# Patient Record
Sex: MALE | Race: WHITE
[De-identification: names, ages, dates, MRNs, and addresses within clinical notes are randomized per-mention and may not be internally consistent; named-entity substitution may affect disease eponyms.]

---

## 2020-02-01 ENCOUNTER — HOSPITAL ENCOUNTER (OUTPATIENT)
Dept: HOSPITAL 59 - ER | Age: 60
Setting detail: OBSERVATION
LOS: 1 days | Discharge: HOME | End: 2020-02-02
Attending: INTERNAL MEDICINE | Admitting: INTERNAL MEDICINE
Payer: OTHER GOVERNMENT

## 2020-02-01 DIAGNOSIS — Z86.14: ICD-10-CM

## 2020-02-01 DIAGNOSIS — D75.81: ICD-10-CM

## 2020-02-01 DIAGNOSIS — R42: ICD-10-CM

## 2020-02-01 DIAGNOSIS — I26.99: ICD-10-CM

## 2020-02-01 DIAGNOSIS — Z96.89: ICD-10-CM

## 2020-02-01 DIAGNOSIS — D64.9: Primary | ICD-10-CM

## 2020-02-01 DIAGNOSIS — R60.9: ICD-10-CM

## 2020-02-01 DIAGNOSIS — D45: ICD-10-CM

## 2020-02-01 DIAGNOSIS — M19.90: ICD-10-CM

## 2020-02-01 DIAGNOSIS — Z79.01: ICD-10-CM

## 2020-02-01 DIAGNOSIS — I82.0: ICD-10-CM

## 2020-02-01 LAB
ABSOLUTE NEUTROPHIL COUNT: 1.34
ALBUMIN SERPL-MCNC: 4 G/DL (ref 4–5)
ALBUMIN/GLOB SERPL: 1.7 {RATIO} (ref 1.1–1.8)
ALP SERPL-CCNC: 52 U/L (ref 40–129)
ALT SERPL-CCNC: 51 U/L (ref ?–41)
ANION GAP SERPL CALC-SCNC: 13 MMOL/L (ref 7–16)
APTT BLD: 27.3 SECONDS (ref 24.5–39.1)
AST SERPL-CCNC: 36 U/L (ref 10–50)
BILIRUB SERPL-MCNC: 1.4 MG/DL (ref 0.2–1)
BUN SERPL-MCNC: 14 MG/DL (ref 8–23)
CO2 SERPL-SCNC: 23 MMOL/L (ref 22–29)
CREAT SERPL-MCNC: 0.6 MG/DL (ref 0.7–1.2)
EOSINOPHIL NFR BLD: 3 % (ref 0–6)
ERYTHROCYTE [DISTWIDTH] IN BLOOD BY AUTOMATED COUNT: 16.8 % (ref 11.5–14.5)
EST GLOMERULAR FILTRATION RATE: > 60 ML/MIN
GLOBULIN SER-MCNC: 2.4 GM/DL (ref 1.4–4.8)
GLUCOSE SERPL-MCNC: 111 MG/DL (ref 74–109)
HCT VFR BLD CALC: 18.3 % (ref 42–52)
HGB BLD-MCNC: 5.5 GM/DL (ref 14–18)
INR PPP: 1.3
LYMPHOCYTES NFR BLD: 21 % (ref 16–45)
MCH RBC QN AUTO: 27.6 PG (ref 27–33)
MCHC RBC AUTO-ENTMCNC: 30.1 G/DL (ref 32–36)
MCV RBC AUTO: 92 FL (ref 81–97)
MONOCYTES NFR BLD: 4 % (ref 0–9)
NEUTROPHILS NFR BLD AUTO: 64 % (ref 47–80)
NEUTS BAND NFR BLD: 8 % (ref 0–5)
PLATELET # BLD EST: (no result) 10*3/UL
PLATELET # BLD: 85 K/UL (ref 130–400)
PMV BLD AUTO: 11.5 FL (ref 7.4–10.4)
PROT SERPL-MCNC: 6.4 G/DL (ref 6.6–8.7)
PROTHROMBIN TIME: 13.2 SECONDS (ref 9.5–12.1)
RBC # BLD AUTO: 1.99 M/UL (ref 4.4–5.7)
WBC # BLD AUTO: 2 K/UL (ref 4.2–12.2)

## 2020-02-01 PROCEDURE — 93005 ELECTROCARDIOGRAM TRACING: CPT

## 2020-02-01 PROCEDURE — 86901 BLOOD TYPING SEROLOGIC RH(D): CPT

## 2020-02-01 PROCEDURE — 80053 COMPREHEN METABOLIC PANEL: CPT

## 2020-02-01 PROCEDURE — 85025 COMPLETE CBC W/AUTO DIFF WBC: CPT

## 2020-02-01 PROCEDURE — 93010 ELECTROCARDIOGRAM REPORT: CPT

## 2020-02-01 PROCEDURE — 99236 HOSP IP/OBS SAME DATE HI 85: CPT

## 2020-02-01 PROCEDURE — 99285 EMERGENCY DEPT VISIT HI MDM: CPT

## 2020-02-01 PROCEDURE — 86900 BLOOD TYPING SEROLOGIC ABO: CPT

## 2020-02-01 PROCEDURE — 86850 RBC ANTIBODY SCREEN: CPT

## 2020-02-01 PROCEDURE — 80048 BASIC METABOLIC PNL TOTAL CA: CPT

## 2020-02-01 PROCEDURE — 85610 PROTHROMBIN TIME: CPT

## 2020-02-01 PROCEDURE — 71045 X-RAY EXAM CHEST 1 VIEW: CPT

## 2020-02-01 PROCEDURE — 85730 THROMBOPLASTIN TIME PARTIAL: CPT

## 2020-02-01 PROCEDURE — 36430 TRANSFUSION BLD/BLD COMPNT: CPT

## 2020-02-01 PROCEDURE — 85027 COMPLETE CBC AUTOMATED: CPT

## 2020-02-01 PROCEDURE — 83880 ASSAY OF NATRIURETIC PEPTIDE: CPT

## 2020-02-01 NOTE — EMERGENCY DEPARTMENT RECORD
History of Present Illness





- General


Chief Complaint: Recheck - Other


Stated Complaint: NEEDS TRANSFUSION


Time Seen by Provider: 20 22:32


Source: Patient, Family


Mode of arrival: Ambulatory


Limitations: No limitations





- History of Present Illness


Initial Comments: 


The patient is here due to being weak, fatigued and mildly dizzy for one day. He

has a long hx of myelodyplastic syndrome and needs frequent blood transfusions. 

The patient is visiting from [facility] and is going home in 2 days for a 

Stem Cell transplant evaluation. He was here 5 weeks ago and did receive 2 units

of blood and since that visit has received 10 units more. Now he is feeling like

he is anemic again. The patient denies any fever, CP, or SOB but he does have a 

mild HA which also is typical for when he needs blood. 





MD Complaint: Other


Onset/Timin


-: Days(s)


Returns Today for: Other


Symptoms Since Prior Visit: Other


Associated Symptoms: Malaise





- Related Data


                                Home Medications











 Medication  Instructions  Recorded  Confirmed  Last Taken


 


Amiloride HCl  20  Unknown


 


Bumetanide 2 mg 20  Unknown


 


[id number]  20  Unknown


 


Deferiprone [Ferriprox] BID 20


 


Pramipexole Di-HCl [Mirapex] 1.25 mg PO 20  Unknown


 


Rivaroxaban [Xarelto] DAILY 20 19:30


 


Ruxolitinib Phosphate [Jakafi] BID 20 19:30


 


Tamsulosin HCl [Flomax] 0.4 mg PO 20  Unknown











                                    Allergies











Allergy/AdvReac Type Severity Reaction Status Date / Time


 


guaifenesin [From Mucinex D] AdvReac  PT UNSURE Verified 19 10:58





   OF REACTION  


 


pseudoephedrine AdvReac  PT UNSURE Verified 19 10:58





[From Mucinex D]   OF REACTION  














Travel Screening





- Travel/Exposure Within Last 30 Days


Have you traveled within the last 30 days?: Yes


Location Detail:: from [place]





- Travel/Exposure Within Last Year


Have you traveled outside the [place] in the last year?: No





- Additonal Travel Details


Have you been exposed to anyone with a communicable illness?: No





- Travel Symptoms


Symptom Screening: None





Review of Systems


Constitutional: Denies: Chills, Fever


Eyes: Denies: Eye discharge


ENT: Denies: Congestion


Respiratory: Denies: Cough, Dyspnea


Cardiovascular: Denies: Chest pain


Endocrine: Reports: Fatigue


Gastrointestinal: Denies: Nausea


Genitourinary: Denies: Dysuria


Musculoskeletal: Denies: Arthralgia


Neurological: Denies: Abnormal gait, Confusion





Past Medical History





- SOCIAL HISTORY


Smoking Status: Never smoker


Alcohol Use: None


Drug Use: None





- RESPIRATORY


Hx Respiratory Disorders: No


Hx Pneumonia: Yes (x3 MRSA)


Comment:: sinus surgery





- CARDIOVASCULAR


Hx Cardio Disorders: No





- NEURO


Hx Neuro Disorders: No





- GI


Hx GI Disorders: No





- 


Hx Genitourinary Disorders: No





- ENDOCRINE


Hx Endocrine Disorders: No


Hx Diabetes: No


Hx Thyroid Disease: No





- MUSCULOSKELETAL


Hx Musculoskeletal Disorders: Yes


Hx Arthritis: Yes


Hx Back Injury: Yes





- PSYCH


Hx Psych Problems: No





- HEMATOLOGY/ONCOLOGY


Hx Hematology/Oncology Disorders: Yes


Hx Anemia: Yes


Hx Blood Disorders: Yes


Hx Blood Transfusions: Yes


Hx Blood Transfusion Reaction: No





Family Medical History


Any Significant Family History?: No


Hx Resp Disorders: Father, Mother





Physical Exam





- General


General Appearance: Alert, Oriented x3, Cooperative, No acute distress





- Head


Head exam: Atraumatic, Normocephalic, Normal inspection





- Eye


Eye exam: Normal appearance, PERRL





- ENT


Throat exam: Normal inspection.  negative: Tonsillar erythema, Tonsillar exudate





- Neck


Neck exam: Normal inspection, Full ROM.  negative: Tenderness





- Respiratory


Respiratory exam: Normal lung sounds bilaterally.  negative: Respiratory distres

s





- Cardiovascular


Cardiovascular Exam: Regular rate, Normal rhythm, Normal heart sounds





- Extremities


Extremities exam: Pedal edema (1+ bilaterally.).  negative: Normal inspection





- Neurological


Neurological exam: Alert.  negative: Motor sensory deficit





Course





                                   Vital Signs











  20





  22:34


 


Temperature 97.9 F


 


Pulse Rate [ 96 H





Pulse Ox Probe] 


 


Respiratory 20





Rate 


 


Blood Pressure 122/72





[Left Arm] 


 


Pulse Ox 100














- Reevaluation(s)


Reevaluation #1: 


The patient is doing very well at this time. I did discuss the low HGB and the 

need for admission for transfusions and the patient does agree.


20 23:41








Medical Decision Making





- Data Complexity


MDM Data: Labs Ordered and/or Reviewed, X-Ray Ordered and/or Reviewed, EKG 

Ordered and/or Reviewed





- Lab Data


Result diagrams: 


                                 20 22:54





                                 20 23:01





- EKG Data


-: EKG Interpreted by Me


EKG: No Acute Changes (Prolonged MI interval.)





- Radiology Data


Radiology results: Report reviewed (CXR; Neg.)





Disposition


Disposition: Admit


Clinical Impression: 


Anemia


Qualifiers:


 Anemia type: unspecified type Qualified Code(s): D64.9 - Anemia, unspecified





Disposition: Still a Patient at [facility]


Decision to Admit: Admit from ER


Decision to Admit Date: 20


Decision to Admit Time: 23:45


Accepting Physician: Dina.


Time Discussed w/Accepting Physician: 23:44


Condition: (2) Stable


Time of Disposition: 23:44





Quality





- Quality Measures


Quality Measures: N/A





- Blood Pressure Screening


View Details: Yes


Does Patient Have Any of the Following: No


Blood Pressure Classification: Normal BP Reading


Systolic Measurement: 102


Diastolic Measurement: 62


Screening for High Blood Pressure: < Normal BP, F/U Not Required > []

## 2020-02-02 LAB
ABO GROUP: (no result)
ABSOLUTE NEUTROPHIL COUNT: 1.6
ABSOLUTE NEUTROPHIL COUNT: 3.09
ANION GAP SERPL CALC-SCNC: 15 MMOL/L (ref 7–16)
ANTIBODY SCREEN: NEGATIVE
BASOPHILS NFR BLD: 0 % (ref 0–6)
BASOPHILS NFR BLD: 0.7 % (ref 0–6)
BUN SERPL-MCNC: 14 MG/DL (ref 8–23)
CO2 SERPL-SCNC: 25 MMOL/L (ref 22–29)
CREAT SERPL-MCNC: 0.7 MG/DL (ref 0.7–1.2)
EOSINOPHIL NFR BLD: 2.9 % (ref 0–6)
EOSINOPHIL NFR BLD: 4 % (ref 0–6)
ERYTHROCYTE [DISTWIDTH] IN BLOOD BY AUTOMATED COUNT: 15.9 % (ref 11.5–14.5)
ERYTHROCYTE [DISTWIDTH] IN BLOOD BY AUTOMATED COUNT: 16.1 % (ref 11.5–14.5)
EST GLOMERULAR FILTRATION RATE: > 60 ML/MIN
GLUCOSE SERPL-MCNC: 188 MG/DL (ref 74–109)
GRANULOCYTES NFR BLD: 75.1 % (ref 47–80)
HCT VFR BLD CALC: 21 % (ref 42–52)
HCT VFR BLD CALC: 29.1 % (ref 42–52)
HGB BLD-MCNC: 6.6 GM/DL (ref 14–18)
HGB BLD-MCNC: 9.2 GM/DL (ref 14–18)
HYPOCHROMIA BLD QL SMEAR: (no result)
IMMED. SPIN CROSSMATCH: (no result)
LYMPHOCYTES NFR BLD AUTO: 15.5 % (ref 16–45)
LYMPHOCYTES NFR BLD: 14 % (ref 16–45)
MCH RBC QN AUTO: 28.2 PG (ref 27–33)
MCH RBC QN AUTO: 28.6 PG (ref 27–33)
MCHC RBC AUTO-ENTMCNC: 31.4 G/DL (ref 32–36)
MCHC RBC AUTO-ENTMCNC: 31.6 G/DL (ref 32–36)
MCV RBC AUTO: 89.3 FL (ref 81–97)
MCV RBC AUTO: 91.3 FL (ref 81–97)
MONOCYTES NFR BLD: 2 % (ref 0–9)
MONOCYTES NFR BLD: 5.8 % (ref 0–9)
NEUTROPHILS NFR BLD AUTO: 80 % (ref 47–80)
NEUTS BAND NFR BLD: 0 % (ref 0–5)
PLATELET # BLD EST: (no result) 10*3/UL
PLATELET # BLD: 104 K/UL (ref 130–400)
PLATELET # BLD: 74 K/UL (ref 130–400)
PMV BLD AUTO: 11.6 FL (ref 7.4–10.4)
PMV BLD AUTO: 12 FL (ref 7.4–10.4)
RBC # BLD AUTO: 2.3 M/UL (ref 4.4–5.7)
RBC # BLD AUTO: 3.26 M/UL (ref 4.4–5.7)
RH TYPE: POSITIVE
WBC # BLD AUTO: 2 K/UL (ref 4.2–12.2)
WBC # BLD AUTO: 4.1 K/UL (ref 4.2–12.2)

## 2020-02-02 NOTE — RADIOLOGY REPORT
EXAMINATION: Single View Chest

EXAM DATE:  2/2/2020 12:11 AM



TECHNIQUE:  Single view chest



INDICATION:  VIVIENNE hx

COMPARISON:  None. 



ENCOUNTER: Not applicable

_________________________



FINDINGS:  



The heart, mediastinum, and pulmonary vasculature are normal.   No lung consolidation or pleural effu
sions are present.  No pneumothorax is present. 

_________________________



IMPRESSION:



No acute cardiopulmonary disease is present.





Dictated by: Hannah Gambino MD on 2/2/2020 12:17 AM.

Electronically signed by: Hannah Gambino MD on 2/2/2020 12:20 AM.

## 2020-02-02 NOTE — HISTORY & PHYSICAL
History of Present Illness





- Date of Service


Date of Service for History & Physical: 02/02/20





- History of Present Illness


Admitting Diagnosis: 1. Acute Severe Anemia.


History of Present Illness: 


The patient is here due to being weak, fatigued and mildly dizzy for one day. He

has a long hx of myelodyplastic syndrome and needs frequent blood transfusions. 

The patient is visiting from [facility] and is going home in 2 days for a 

Stem Cell transplant evaluation. He was here 5 weeks ago and did receive 2 units

of blood and since that visit has received 10 units more. Now he is feeling like

he is anemic again. The patient denies any fever, CP, or SOB but he does have a 

mild HA which also is typical for when he needs blood. He does have history of 

hemochromatosis that was diagnosed in 2007, had several plasma phoresis 

treatments and was subsequently diagnosed with Budd Chiari syndrome and 

myelofibrosis in 2012. He is currently enrolled in a drug trial taking CPI 0610 

that began in August 2019, has received 8 cycles to date. During TIPPS procedure

in 2012 he developed right PE and has been on anticoagulant therapy Xarelto.  He

reports is at his baseline chronic health and usual lab values including 

chronically elevated bilirubin. He will be admitted for transfusion PRBC to goal

of Hgb 7-8 prior to discharge for his planned travel back to his home in [place] 

to resume his treatment plan for possible stem cell transplant. PCP Dr. Lyles In

man





Significant ED findings;


WBC 2.0


Hgb 5.5


Hct 18.3


Plt 85


Coags normal


Calcium 8.6


Total bilirubin 1.4


.4


CXR negative


EKG NSR, prolonged IA





                          PAST MEDICAL/SURGICAL HISTORY





Past Surgical History            right inquinal hernia 10/13/89


                                 20/2012 TIPS procedure-myeolfibrosis


                                 Left scope 4/2013


                                 TIPS reconstruction 9/2018





PMH - Respiratory





Hx Respiratory Disorders         No


Hx Pneumonia                     Yes: x3 MRSA


Comment:                         sinus surgery





PMH - Cardiovascular





Hx Cardiovascular Disorders      No





PMH - Neuro





Hx Neurological Disorders        No





PMH - GI





Hx Gastrointestinal Disorders    No





PMH - 





Hx Genitourinary Disorders       No





PMH - Endocrine





Hx Endocrine Disorders           No


Hx Diabetes                      No


Hx Thyroid Disease               No





PMH - Musculoskeletal





Hx Musculoskeletal Disorders     Yes


Hx Arthritis                     Yes


Hx Back Injury                   Yes





PMH - Psych





Hx Psychiatric Problems          No





PMH - Hematology/Oncology





Hx Hematology/Oncology           Yes


Disorders                        


Hx Anemia                        Yes


Hx Blood Disorders               Yes


Hx Blood Transfusion Reaction    No





                               Laboratory Results











WBC  2.0 K/uL (4.2-12.2)  L  02/02/20  08:07    


 


RBC  2.30 M/uL (4.40-5.70)  L  02/02/20  08:07    


 


Hgb  6.6 gm/dl (14.0-18.0)  L*  02/02/20  08:07    


 


Hct  21.0 % (42.0-52.0)  L  02/02/20  08:07    


 


MCV  91.3 fl (81-97)   02/02/20  08:07    


 


MCH  28.6 pg (27-33)   02/02/20  08:07    


 


MCHC  31.4 g/dl (32-36)  L  02/02/20  08:07    


 


RDW  16.1 % (11.5-14.5)  H  02/02/20  08:07    


 


Plt Count  74 K/uL (130-400)  L  02/02/20  08:07    


 


MPV  12.0 fl (7.4-10.4)  H  02/02/20  08:07    


 


Neutrophils %  64.0 % (47-80)   02/01/20  22:54    


 


Band Neutrophils %  8.0 % (0-5)  H  02/01/20  22:54    


 


Eosinophils %  Not Reportable   02/02/20  08:07    


 


Basophils %  Not Reportable   02/02/20  08:07    


 


Absolute Neutrophils  1.34   02/01/20  22:54    


 


Lymphocytes  21.0 % (16-45)   02/01/20  22:54    


 


Monocytes  4.0 % (0-9)   02/01/20  22:54    


 


Differential Comment     02/01/20  22:54    


 


Platelet Estimate  Decreased  (NORMAL)   02/01/20  22:54    


 


Eosinophil Count  3.0 % (0-6)   02/01/20  22:54    


 


PT  13.2 SECONDS (9.5-12.1)  H  02/01/20  23:01    


 


INR  1.3   02/01/20  23:01    


 


APTT  27.3 SECONDS (24.5-39.1)   02/01/20  23:01    


 


Sodium  141 mmol/L (136-145)   02/01/20  23:01    


 


Potassium  3.5 mmol/L (3.4-4.5)   02/01/20  23:01    


 


Chloride  105 mmol/L ()   02/01/20  23:01    


 


Carbon Dioxide  23.0 mmol/L (22-29)   02/01/20  23:01    


 


Anion Gap  13.0  (7-16)   02/01/20  23:01    


 


BUN  14 mg/dL (8-23)   02/01/20  23:01    


 


Creatinine  0.6 mg/dL (0.7-1.2)  L  02/01/20  23:01    


 


Estimated GFR  > 60 mL/min  02/01/20  23:01    


 


Random Glucose  111 mg/dL ()  H  02/01/20  23:01    


 


Calcium  8.6 mg/dL (8.8-10.2)  L  02/01/20  23:01    


 


Total Bilirubin  1.40 mg/dL (0.2-1.0)  H  02/01/20  23:01    


 


AST  36 U/L (10.0-50.0)   02/01/20  23:01    


 


ALT  51 U/L (<41)  H  02/01/20  23:01    


 


Alkaline Phosphatase  52 U/L ()   02/01/20  23:01    


 


NT-Pro-B Natriuret Pep  268.40 pg/mL (<125)  H  02/01/20  23:01    


 


Total Protein  6.4 g/dL (6.6-8.7)  L  02/01/20  23:01    


 


Albumin  4.0 g/dL (4.0-5.0)   02/01/20  23:01    


 


Globulin  2.4 gm/dL (1.4-4.8)   02/01/20  23:01    


 


Albumin/Globulin Ratio  1.7  (1.1-1.8)   02/01/20  23:01    


 


ABO Group  A   02/01/20  23:05    


 


Rh Factor  Positive   02/01/20  23:05    


 


Antibody Screen  Negative  (NEGATIVE)   02/01/20  23:05    


 


Crossmatch  Yes  (YES)   02/01/20  00:00    


 


Crossmatch  Yes  (YES)   02/01/20  00:00    








                            Vital Signs - Last 24 Hrs











  Temp Pulse Resp BP Pulse Ox


 


 02/02/20 07:00  98.1 F  72  16  117/71  97


 


 02/02/20 03:34  97.9 F  74  16  109/68  99


 


 02/02/20 02:55  97.9 F  80  16  102/67  98


 


 02/02/20 02:06   82  16  


 


 02/02/20 01:20  97.6 F  82  16  105/66  96


 


 02/01/20 23:41   80  20  102/62  96


 


 02/01/20 22:34  97.9 F  96 H  20  122/72  100

















Travel Screening





- Travel/Exposure Within Last 30 Days


Have you traveled within the last 30 days?: Yes


Location Detail:: [place]





- Travel/Exposure Within Last Year


Have you traveled outside the [place] in the last year?: No





- Additonal Travel Details


Have you been exposed to anyone with a communicable illness?: Yes


Exposure Details:: wife influenza





- Travel Symptoms


Symptom Screening: Headache, Weakness





Review of Systems


Constitutional: Denies: Chills, Fever


Eyes: Denies: Eye discharge


ENT: Denies: Congestion


Respiratory: Denies: Cough, Dyspnea


Cardiovascular: Denies: Chest pain


Endocrine: Reports: Fatigue


Gastrointestinal: Denies: Nausea


Genitourinary: Denies: Dysuria


Musculoskeletal: Denies: Arthralgia


Neurological: Denies: Abnormal gait, Confusion





Past Medical History





- SOCIAL HISTORY


Smoking Status: Never smoker


Alcohol Use: None


Drug Use: None





- RESPIRATORY


Hx Respiratory Disorders: No


Hx Pneumonia: Yes (x3 MRSA)


Comment:: sinus surgery





- CARDIOVASCULAR


Hx Cardio Disorders: No





- NEURO


Hx Neuro Disorders: No





- GI


Hx GI Disorders: No





- 


Hx Genitourinary Disorders: No





- ENDOCRINE


Hx Endocrine Disorders: No


Hx Diabetes: No


Hx Thyroid Disease: No





- MUSCULOSKELETAL


Hx Musculoskeletal Disorders: Yes


Hx Arthritis: Yes


Hx Back Injury: Yes





- PSYCH


Hx Psych Problems: No





- HEMATOLOGY/ONCOLOGY


Hx Hematology/Oncology Disorders: Yes


Hx Anemia: Yes


Hx Blood Disorders: Yes


Hx Blood Transfusions: Yes


Hx Blood Transfusion Reaction: No





Family Medical History


Any Significant Family History?: No


Hx Resp Disorders: Father, Mother





H&P Meds/Allergies





- Allergies


Allergies: 


                                    Allergies











Allergy/AdvReac Type Severity Reaction Status Date / Time


 


guaifenesin [From Mucinex D] AdvReac  PT UNSURE Verified 12/30/19 10:58





   OF REACTION  


 


pseudoephedrine AdvReac  PT UNSURE Verified 12/30/19 10:58





[From Mucinex D]   OF REACTION  














- Home Medications


                                Home Medications











 Medication  Instructions  Recorded  Confirmed  Last Taken


 


Amiloride HCl 15 mg PO BID 02/01/20 02/02/20 Unknown


 


Bumetanide 3 mg PO BID 02/01/20 02/02/20 Unknown


 


Deferiprone [Ferriprox] 1,500 mg PO TID 02/01/20 02/02/20 02/01/20


 


Pramipexole Di-HCl [Mirapex] 2 mg PO DAILY 02/01/20 02/02/20 Unknown


 


Rivaroxaban [Xarelto] 10 mg PO DAILY 02/01/20 02/02/20 02/01/20 19:30


 


Ruxolitinib Phosphate [Jakafi] 10 mg PO BID 02/01/20 02/02/20 02/01/20 19:30


 


Tamsulosin HCl [Flomax] 0.4 mg PO DAILY 02/01/20 02/02/20 Unknown


 


Copper Gluconate [Copper] 2 mg PO DAILY 02/02/20 02/02/20 Unknown


 


Pyridoxine HCl (Vitamin B6) 100 mg PO DAILY 02/02/20 02/02/20 Unknown





[Vitamin B-6]    


 


Thiamine HCl [B-1] 125 mg PO DAILY 02/02/20 02/02/20 Unknown














- Active Medications


Active Medications: 


                               Current Medications





Acetaminophen (Tylenol 325mg)  650 mg PO Q6H PRN


   PRN Reason: PAIN - MILD(1-4)/FEVER


Diphenhydramine HCl (Benadryl Capsule)  25 mg PO Q6H PRN


   PRN Reason: ALLERGY SYMPTOMS











Physical Exam





- Vital Signs


Vital Signs: 


                            Vital Signs - Last 24 Hrs











  Temp Pulse Resp BP Pulse Ox


 


 02/02/20 07:00  98.1 F  72  16  117/71  97


 


 02/02/20 03:34  97.9 F  74  16  109/68  99


 


 02/02/20 02:55  97.9 F  80  16  102/67  98


 


 02/02/20 02:06   82  16  


 


 02/02/20 01:20  97.6 F  82  16  105/66  96


 


 02/01/20 23:41   80  20  102/62  96


 


 02/01/20 22:34  97.9 F  96 H  20  122/72  100














- General


General Appearance: Alert, Oriented x3, Cooperative, No acute distress, Other 

(jaundiced)


Limitations: No limitations





- Head


Head exam: Atraumatic, Normocephalic, Normal inspection





- Eye


Eye exam: Normal appearance, PERRL, Scleral icterus





- ENT


ENT exam: Mucous membranes moist


Throat exam: Normal inspection.  negative: Tonsillar erythema, Tonsillar exudate





- Neck


Neck exam: Normal inspection, Full ROM.  negative: Tenderness





- Respiratory


Respiratory exam: Normal lung sounds bilaterally.  negative: Respiratory distr

ess





- Cardiovascular


Cardiovascular Exam: Regular rate, Normal rhythm, Normal heart sounds


Peripheral Pulses: 3+: Radial (R), Radial (L)





- GI/Abdominal


GI/Abdominal exam: Soft.  negative: Tenderness





- Extremities


Extremities exam: Pedal edema (1+ bilaterally, baseline per patient report).  

negative: Normal inspection





- Neurological


Neurological exam: Alert, Normal gait, Oriented X3.  negative: Motor sensory 

deficit





Results





- Labs


Result Diagrams: 


                                 02/02/20 08:07





                                 02/01/20 23:01


Labs Last 24 Hours: 


                         Laboratory Results - last 24 hr











  02/01/20 02/01/20 02/01/20





  00:00 00:00 22:54


 


WBC    2.0 L


 


RBC    1.99 L


 


Hgb    5.5 L*


 


Hct    18.3 L


 


MCV    92.0


 


MCH    27.6


 


MCHC    30.1 L


 


RDW    16.8 H


 


Plt Count    85 L


 


MPV    11.5 H


 


Neutrophils %    64.0


 


Band Neutrophils %    8.0 H


 


Eosinophils %    Not Reportable


 


Basophils %    Not Reportable


 


Absolute Neutrophils    1.34


 


Lymphocytes    21.0


 


Monocytes    4.0


 


Differential Comment    


 


Platelet Estimate    Decreased


 


Eosinophil Count    3.0


 


PT   


 


INR   


 


APTT   


 


Sodium   


 


Potassium   


 


Chloride   


 


Carbon Dioxide   


 


Anion Gap   


 


BUN   


 


Creatinine   


 


Estimated GFR   


 


Random Glucose   


 


Calcium   


 


Total Bilirubin   


 


AST   


 


ALT   


 


Alkaline Phosphatase   


 


NT-Pro-B Natriuret Pep   


 


Total Protein   


 


Albumin   


 


Globulin   


 


Albumin/Globulin Ratio   


 


ABO Group   


 


Rh Factor   


 


Antibody Screen   


 


Crossmatch  Yes  Yes 














  02/01/20 02/01/20 02/01/20





  23:01 23:01 23:01


 


WBC   


 


RBC   


 


Hgb   


 


Hct   


 


MCV   


 


MCH   


 


MCHC   


 


RDW   


 


Plt Count   


 


MPV   


 


Neutrophils %   


 


Band Neutrophils %   


 


Eosinophils %   


 


Basophils %   


 


Absolute Neutrophils   


 


Lymphocytes   


 


Monocytes   


 


Differential Comment   


 


Platelet Estimate   


 


Eosinophil Count   


 


PT   13.2 H 


 


INR   1.3 


 


APTT   27.3 


 


Sodium  141  


 


Potassium  3.5  


 


Chloride  105  


 


Carbon Dioxide  23.0  


 


Anion Gap  13.0  


 


BUN  14  


 


Creatinine  0.6 L  


 


Estimated GFR  > 60  


 


Random Glucose  111 H  


 


Calcium  8.6 L  


 


Total Bilirubin  1.40 H  


 


AST  36  


 


ALT  51 H  


 


Alkaline Phosphatase  52  


 


NT-Pro-B Natriuret Pep    268.40 H


 


Total Protein  6.4 L  


 


Albumin  4.0  


 


Globulin  2.4  


 


Albumin/Globulin Ratio  1.7  


 


ABO Group   


 


Rh Factor   


 


Antibody Screen   


 


Crossmatch   














  02/01/20 02/02/20





  23:05 08:07


 


WBC   2.0 L


 


RBC   2.30 L


 


Hgb   6.6 L*


 


Hct   21.0 L


 


MCV   91.3


 


MCH   28.6


 


MCHC   31.4 L


 


RDW   16.1 H


 


Plt Count   74 L


 


MPV   12.0 H


 


Neutrophils %  


 


Band Neutrophils %  


 


Eosinophils %   Not Reportable


 


Basophils %   Not Reportable


 


Absolute Neutrophils  


 


Lymphocytes  


 


Monocytes  


 


Differential Comment  


 


Platelet Estimate  


 


Eosinophil Count  


 


PT  


 


INR  


 


APTT  


 


Sodium  


 


Potassium  


 


Chloride  


 


Carbon Dioxide  


 


Anion Gap  


 


BUN  


 


Creatinine  


 


Estimated GFR  


 


Random Glucose  


 


Calcium  


 


Total Bilirubin  


 


AST  


 


ALT  


 


Alkaline Phosphatase  


 


NT-Pro-B Natriuret Pep  


 


Total Protein  


 


Albumin  


 


Globulin  


 


Albumin/Globulin Ratio  


 


ABO Group  A 


 


Rh Factor  Positive 


 


Antibody Screen  Negative 


 


Crossmatch  














- Imaging and Cardiology


  ** Chest x-ray


Status: Report reviewed





VTE H&P Assessment





- Risk for VTE


Risk for VTE: Yes


Risk Level: Moderate


Risk Assessment Date: 02/02/20


Risk Assessment Time: 08:27


VTE Orders Placed or Will Be Placed: Yes





Plan





- Inpatient Certification


Inpatient Certification: 


Admit to inpatient care: Based on my medical assessment, after consideration of 

patient's risk factors (age, co-morbidities and patient presenting symptoms and 

acuity), I expect that this patient will remain in the hospital greater than or 

equal to two midnights and that the services needed warrant inpatient care 

because:





Patient Risk Factors: severe symptomatic anemia]





Estimated length of stay: [48-72 ours]  The patient may reasonably be expected 

to be discharged or transferred to a hospital within 96 hours after admission to

[facility].





Services needed: [PRBC infusions]





Post hospital care (if known): []





I certify that my determination is in accordance with my understanding of 

Medicare requirements for reasonable and necessary inpatient services.





02/02/20 08:33








- Detailed Diagnosis and Plan


(1) Anemia


Current Visit: Yes   Status: Acute   


Qualifiers: 


   Anemia type: bone marrow failure 


Base Code: D64.9 - ANEMIA, UNSPECIFIED   Comment: 2/2/20


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


- Hgb 5.5 in ED, 6.6 after 2 units PRBC


- Plan to return to [place] tomorrow for continue planning for stem cell 

transplant


- Will transfuse 2 additional units of PRBC followed by Lasix 40mg IV x1 to 

reach at least HGb 7.0 prior to discharging   





(2) Polycythemia vera


Current Visit: Yes   Status: Acute   Base Code: D45 - POLYCYTHEMIA VERA   

Comment: 2/2/20


- Plan as above


- Continue home meds (Ameloride, Bumex, Xarelto, Ferriprox   





(3) DVT prophylaxis


Current Visit: Yes   Status: Acute   Base Code: Z29.9 - ENCOUNTER FOR 

PROPHYLACTIC MEASURES, UNSPECIFIED   Comment: 2/2/20


- Xarelto per home dosing   





(4) Full code status


Current Visit: Yes   Status: Acute   Base Code: Z78.9 - OTHER SPECIFIED HEALTH 

STATUS

## 2020-02-02 NOTE — DISCHARGE SUMMARY
Providers


Discharge Summary Date: 02/02/20


Date of admission: 


02/02/20 00:22





Expected Date of Discharge: 02/02/20


Attending physician: 


PAYAL YEAGER





Primary care physician: 


Trupti Bah








Physical Exam





- Vital Signs


Vital Signs: 


                            Vital Signs - Last 24 Hrs











  Temp Pulse Resp BP Pulse Ox


 


 02/02/20 11:00  98.1 F  91 H  15  116/63  97


 


 02/02/20 09:00    15  


 


 02/02/20 07:00  98.1 F  72  16  117/71  97


 


 02/02/20 03:34  97.9 F  74  16  109/68  99


 


 02/02/20 02:55  97.9 F  80  16  102/67  98


 


 02/02/20 02:06   82  16  


 


 02/02/20 01:20  97.6 F  82  16  105/66  96


 


 02/01/20 23:41   80  20  102/62  96


 


 02/01/20 22:34  97.9 F  96 H  20  122/72  100














- General


General Appearance: Alert, Oriented x3, Cooperative, No acute distress, Other 

(jaundiced)


Limitations: No limitations





- Head


Head exam: Atraumatic, Normocephalic, Normal inspection





- Eye


Eye exam: Normal appearance, PERRL, Scleral icterus





- ENT


ENT exam: Mucous membranes moist


Throat exam: Normal inspection.  negative: Tonsillar erythema, Tonsillar exudate





- Neck


Neck exam: Normal inspection, Full ROM.  negative: Tenderness





- Respiratory


Respiratory exam: Normal lung sounds bilaterally.  negative: Respiratory 

distress





- Cardiovascular


Cardiovascular Exam: Regular rate, Normal rhythm, Normal heart sounds


Peripheral Pulses: 3+: Radial (R), Radial (L)





- GI/Abdominal


GI/Abdominal exam: Soft.  negative: Tenderness





- Extremities


Extremities exam: Pedal edema (1+ bilaterally, baseline per patient report).  

negative: Normal inspection





- Neurological


Neurological exam: Alert, Normal gait, Oriented X3.  negative: Motor sensory 

deficit





Hospitalization





- Hospitalization


Admission Diagnosis: 1. Acute Severe Anemia.





- Problem List/Discharge Diagnosis


(1) Anemia


Status: Acute   


Discharge Diagnosis: 


   Anemia type: bone marrow failure 


Base Code: D64.9 - ANEMIA, UNSPECIFIED   Comment: 2/2/20


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


- Hgb 5.5 in ED, 6.6 after 2 units PRBC


- Plan to return to [place] tomorrow for continue planning for stem cell 

transplant


- Will transfuse 2 additional units of PRBC followed by Lasix 40mg IV x1 to 

reach at least HGb 7.0 prior to discharging   





(2) Polycythemia vera


Status: Acute   Base Code: D45 - POLYCYTHEMIA VERA   Comment: 2/2/20


- Plan as above


- Continue home meds (Ameloride, Bumex, Xarelto, Ferriprox   





(3) DVT prophylaxis


Status: Acute   Base Code: Z29.9 - ENCOUNTER FOR PROPHYLACTIC MEASURES, 

UNSPECIFIED   Comment: 2/2/20


- Xarelto per home dosing   





(4) Full code status


Status: Acute   Base Code: Z78.9 - OTHER SPECIFIED HEALTH STATUS   





- Hospitalization Course


Disposition: Home, Self-Care


Hospital Course: 


The patient is here due to being weak, fatigued and mildly dizzy for one day. He

has a long hx of myelodyplastic syndrome and needs frequent blood transfusions. 

The patient is visiting from [facility] and is going home in 2 days for a 

Stem Cell transplant evaluation. He was here 5 weeks ago and did receive 2 units

of blood and since that visit has received 10 units more. Now he is feeling like

he is anemic again. The patient denies any fever, CP, or SOB but he does have a 

mild HA which also is typical for when he needs blood. He does have history of 

hemochromatosis that was diagnosed in 2007, had several plasma phoresis 

treatments and was subsequently diagnosed with Budd Chiari syndrome and 

myelofibrosis in 2012. He is currently enrolled in a drug trial taking CPI 0610 

that began in August 2019, has received 8 cycles to date. During TIPPS procedure

in 2012 he developed right PE and has been on anticoagulant therapy Xarelto.  He

reports is at his baseline chronic health and usual lab values including 

chronically elevated bilirubin. He will be admitted for transfusion PRBC to goal

of Hgb 7-8 prior to discharge for his planned travel back to his home in [place] 

to resume his treatment plan for possible stem cell transplant. PCP Dr. Trupti Bah





Significant ED findings;


WBC 2.0


Hgb 5.5


Hct 18.3


Plt 85


Coags normal


Calcium 8.6


Total bilirubin 1.4


.4


CXR negative


EKG NSR, prolonged [place]





                          PAST MEDICAL/SURGICAL HISTORY





Past Surgical History            right inquinal hernia 10/13/89


                                 20/2012 TIPS procedure-myeolfibrosis


                                 Left scope 4/2013


                                 TIPS reconstruction 9/2018





PMH - Respiratory





Hx Respiratory Disorders         No


Hx Pneumonia                     Yes: x3 MRSA


Comment:                         sinus surgery





PMH - Cardiovascular





Hx Cardiovascular Disorders      No





PMH - Neuro





Hx Neurological Disorders        No





PMH - GI





Hx Gastrointestinal Disorders    No





PMH - 





Hx Genitourinary Disorders       No





PMH - Endocrine





Hx Endocrine Disorders           No


Hx Diabetes                      No


Hx Thyroid Disease               No





PMH - Musculoskeletal





Hx Musculoskeletal Disorders     Yes


Hx Arthritis                     Yes


Hx Back Injury                   Yes





PMH - Psych





Hx Psychiatric Problems          No





PMH - Hematology/Oncology





Hx Hematology/Oncology           Yes


Disorders                        


Hx Anemia                        Yes


Hx Blood Disorders               Yes


Hx Blood Transfusion Reaction    No





                               Laboratory Results











WBC  2.0 K/uL (4.2-12.2)  L  02/02/20  08:07    


 


RBC  2.30 M/uL (4.40-5.70)  L  02/02/20  08:07    


 


Hgb  6.6 gm/dl (14.0-18.0)  L*  02/02/20  08:07    


 


Hct  21.0 % (42.0-52.0)  L  02/02/20  08:07    


 


MCV  91.3 fl (81-97)   02/02/20  08:07    


 


MCH  28.6 pg (27-33)   02/02/20  08:07    


 


MCHC  31.4 g/dl (32-36)  L  02/02/20  08:07    


 


RDW  16.1 % (11.5-14.5)  H  02/02/20  08:07    


 


Plt Count  74 K/uL (130-400)  L  02/02/20  08:07    


 


MPV  12.0 fl (7.4-10.4)  H  02/02/20  08:07    


 


Neutrophils %  64.0 % (47-80)   02/01/20  22:54    


 


Band Neutrophils %  8.0 % (0-5)  H  02/01/20  22:54    


 


Eosinophils %  Not Reportable   02/02/20  08:07    


 


Basophils %  Not Reportable   02/02/20  08:07    


 


Absolute Neutrophils  1.34   02/01/20  22:54    


 


Lymphocytes  21.0 % (16-45)   02/01/20  22:54    


 


Monocytes  4.0 % (0-9)   02/01/20  22:54    


 


Differential Comment     02/01/20  22:54    


 


Platelet Estimate  Decreased  (NORMAL)   02/01/20  22:54    


 


Eosinophil Count  3.0 % (0-6)   02/01/20  22:54    


 


PT  13.2 SECONDS (9.5-12.1)  H  02/01/20  23:01    


 


INR  1.3   02/01/20  23:01    


 


APTT  27.3 SECONDS (24.5-39.1)   02/01/20  23:01    


 


Sodium  141 mmol/L (136-145)   02/01/20  23:01    


 


Potassium  3.5 mmol/L (3.4-4.5)   02/01/20  23:01    


 


Chloride  105 mmol/L ()   02/01/20  23:01    


 


Carbon Dioxide  23.0 mmol/L (22-29)   02/01/20  23:01    


 


Anion Gap  13.0  (7-16)   02/01/20  23:01    


 


BUN  14 mg/dL (8-23)   02/01/20  23:01    


 


Creatinine  0.6 mg/dL (0.7-1.2)  L  02/01/20  23:01    


 


Estimated GFR  > 60 mL/min  02/01/20  23:01    


 


Random Glucose  111 mg/dL ()  H  02/01/20  23:01    


 


Calcium  8.6 mg/dL (8.8-10.2)  L  02/01/20  23:01    


 


Total Bilirubin  1.40 mg/dL (0.2-1.0)  H  02/01/20  23:01    


 


AST  36 U/L (10.0-50.0)   02/01/20  23:01    


 


ALT  51 U/L (<41)  H  02/01/20  23:01    


 


Alkaline Phosphatase  52 U/L ()   02/01/20  23:01    


 


NT-Pro-B Natriuret Pep  268.40 pg/mL (<125)  H  02/01/20  23:01    


 


Total Protein  6.4 g/dL (6.6-8.7)  L  02/01/20  23:01    


 


Albumin  4.0 g/dL (4.0-5.0)   02/01/20  23:01    


 


Globulin  2.4 gm/dL (1.4-4.8)   02/01/20  23:01    


 


Albumin/Globulin Ratio  1.7  (1.1-1.8)   02/01/20  23:01    


 


ABO Group  A   02/01/20 23:05    


 


Rh Factor  Positive   02/01/20  23:05    


 


Antibody Screen  Negative  (NEGATIVE)   02/01/20  23:05    


 


Crossmatch  Yes  (YES)   02/01/20  00:00    


 


Crossmatch  Yes  (YES)   02/01/20  00:00    








                            Vital Signs - Last 24 Hrs











  Temp Pulse Resp BP Pulse Ox


 


 02/02/20 07:00  98.1 F  72  16  117/71  97


 


 02/02/20 03:34  97.9 F  74  16  109/68  99


 


 02/02/20 02:55  97.9 F  80  16  102/67  98


 


 02/02/20 02:06   82  16  


 


 02/02/20 01:20  97.6 F  82  16  105/66  96


 


 02/01/20 23:41   80  20  102/62  96


 


 02/01/20 22:34  97.9 F  96 H  20  122/72  100











2/2/20- Hospital course unremarkable. He did receive a total of 4U PRBC  and 

discharge with Hgb 9.2 Tolerated well, did receive Lasix 40mg IV X 1 in addition

to his home medications to help with fluid burden. The dizziness and headache he

arrived with have completely resolved. Will discharge home for him to return to 

his home state to continue his plan of care for possible stem cell transplant.


Procedures: 


Imaging and X-Rays





02/01/20 23:54


CHEST 1 VIEW [RAD] Stat 








Cardiology Procedures





02/01/20 23:54


EKG NOW 





02/02/20 00:09


Cardiac Monitor .Continuous 











Abnormal Labs: 


                              Abnormal Lab Results











  02/01/20 02/01/20 02/01/20 Range/Units





  22:54 23:01 23:01 


 


WBC  2.0 L    (4.2-12.2)  K/uL


 


RBC  1.99 L    (4.40-5.70)  M/uL


 


Hgb  5.5 L*    (14.0-18.0)  gm/dl


 


Hct  18.3 L    (42.0-52.0)  %


 


MCHC  30.1 L    (32-36)  g/dl


 


RDW  16.8 H    (11.5-14.5)  %


 


Plt Count  85 L    (130-400)  K/uL


 


MPV  11.5 H    (7.4-10.4)  fl


 


Band Neutrophils %  8.0 H    (0-5)  %


 


Lymphocytes     (16-45)  %


 


PT    13.2 H  (9.5-12.1)  SECONDS


 


Creatinine   0.6 L   (0.7-1.2)  mg/dL


 


Random Glucose   111 H   ()  mg/dL


 


Calcium   8.6 L   (8.8-10.2)  mg/dL


 


Total Bilirubin   1.40 H   (0.2-1.0)  mg/dL


 


ALT   51 H   (<41)  U/L


 


NT-Pro-B Natriuret Pep     (<125)  pg/mL


 


Total Protein   6.4 L   (6.6-8.7)  g/dL














  02/01/20 02/02/20 Range/Units





  23:01 08:07 


 


WBC   2.0 L  (4.2-12.2)  K/uL


 


RBC   2.30 L  (4.40-5.70)  M/uL


 


Hgb   6.6 L*  (14.0-18.0)  gm/dl


 


Hct   21.0 L  (42.0-52.0)  %


 


MCHC   31.4 L  (32-36)  g/dl


 


RDW   16.1 H  (11.5-14.5)  %


 


Plt Count   74 L  (130-400)  K/uL


 


MPV   12.0 H  (7.4-10.4)  fl


 


Band Neutrophils %    (0-5)  %


 


Lymphocytes   14.0 L  (16-45)  %


 


PT    (9.5-12.1)  SECONDS


 


Creatinine    (0.7-1.2)  mg/dL


 


Random Glucose    ()  mg/dL


 


Calcium    (8.8-10.2)  mg/dL


 


Total Bilirubin    (0.2-1.0)  mg/dL


 


ALT    (<41)  U/L


 


NT-Pro-B Natriuret Pep  268.40 H   (<125)  pg/mL


 


Total Protein    (6.6-8.7)  g/dL











Condition at Discharge: (2) Stable





Discharge Medications





- Discharge Medications


Home Medications: 


                                Ambulatory Orders





Amiloride HCl 15 mg PO BID 02/01/20 [Last Taken Unknown]


Bumetanide 3 mg PO BID 02/01/20 [Last Taken Unknown]


Deferiprone [Ferriprox] 1,500 mg PO TID 02/01/20 [Last Taken 02/01/20]


Pramipexole Di-HCl [Mirapex] 2 mg PO DAILY 02/01/20 [Last Taken Unknown]


Rivaroxaban [Xarelto] 10 mg PO DAILY 02/01/20 [Last Taken 02/01/20 19:30]


Ruxolitinib Phosphate [Jakafi] 10 mg PO BID 02/01/20 [Last Taken 02/01/20 19:30]


Tamsulosin HCl [Flomax] 0.4 mg PO DAILY 02/01/20 [Last Taken Unknown]


Copper Gluconate [Copper] 2 mg PO DAILY 02/02/20 [Last Taken Unknown]


Pyridoxine HCl (Vitamin B6) [Vitamin B-6] 100 mg PO DAILY 02/02/20 [Last Taken 

Unknown]


Thiamine HCl [B-1] 125 mg PO DAILY 02/02/20 [Last Taken Unknown]











Discharge Plan





- Discharge Instructions


Activity at Discharge: Increase Activity as Tolerated


Diet at Discharge: Advance to Usual Diet


Instructions:  Myelodysplastic Syndromes (DC)


Additional Instructions: 











Activity: 


 


 Increase Activity as Tolerated














Diet: Advance to Usual Diet














Consults: []














Follow Up: [Follow up with PCP and specialist as already scheduled]














Dressing/Wound Care:  





 (Type)                                 (Change) 














Additional: [Continue home medications





Return to the ED with any new or worsening symptoms]











Quality Measures





- Quality Measures


Quality Measures: Documentation of Current Medications in Medical Record, 

Screening for High Blood Pressure and F/U Documented





- Current Medications


Quality Measure: Measure #130: Documentation of Current Medications


Documentation of Current Medications: <Current Medications Documented/Reviewed> 

[]





- Blood Pressure Screening


Quality Measure: Screening for High Blood Pressure and Follow-Up Documented


Does Patient Have Any of the Following: No


Blood Pressure Classification: Normal BP Reading


Systolic Measurement: 113


Diastolic Measurement: 70


Screening for High Blood Pressure: < Normal BP, F/U Not Required > []





- Elder Abuse Suspicion Index


EASI Reference Information: Ivis PENALOZA, Shreya C, Elier D, Mckay BRANDON.Development

 and validation of a tool to assist physicians identification of elder abuse: 

The Elder Abuse Suspicion  Index (EASI ).  Journal of Elder Abuse and Neglect, 

2008; 20 (3): 276-300.